# Patient Record
Sex: MALE | Race: WHITE | HISPANIC OR LATINO | Employment: FULL TIME | URBAN - METROPOLITAN AREA
[De-identification: names, ages, dates, MRNs, and addresses within clinical notes are randomized per-mention and may not be internally consistent; named-entity substitution may affect disease eponyms.]

---

## 2018-02-17 ENCOUNTER — HOSPITAL ENCOUNTER (EMERGENCY)
Facility: HOSPITAL | Age: 53
Discharge: HOME | End: 2018-02-17
Attending: EMERGENCY MEDICINE

## 2018-02-17 VITALS
HEIGHT: 66 IN | DIASTOLIC BLOOD PRESSURE: 88 MMHG | TEMPERATURE: 98 F | BODY MASS INDEX: 35.36 KG/M2 | WEIGHT: 220 LBS | HEART RATE: 78 BPM | RESPIRATION RATE: 18 BRPM | SYSTOLIC BLOOD PRESSURE: 156 MMHG | OXYGEN SATURATION: 100 %

## 2018-02-17 DIAGNOSIS — M25.729 OLECRANON BONE SPUR: Primary | ICD-10-CM

## 2018-02-17 DIAGNOSIS — R52 PAIN: ICD-10-CM

## 2018-02-17 PROCEDURE — 99283 EMERGENCY DEPT VISIT LOW MDM: CPT

## 2018-02-17 RX ORDER — INDOMETHACIN 50 MG/1
50 CAPSULE ORAL
Qty: 15 CAPSULE | Refills: 0 | Status: SHIPPED | OUTPATIENT
Start: 2018-02-17

## 2018-02-17 NOTE — ED PROVIDER NOTES
Encounter Date: 2/17/2018       History     Chief Complaint   Patient presents with    Joint Swelling     right elbow pain x 3.5 weeks.      Paul Joe 53 y.o. male with history of chronic low back painpresented to the ED with c/o right elbow and proximal forearm pain for approximate 3-1/2 weeks.  He states that he had a mechanical slip and fall onto ice at that time with direct impact to the right elbow.  He states that he began with some pain to the elbow which he thought would resolve over some time a complain with ice and rest. He states that the pain has not improved despite completing this regimen. Does report that he takes indomethacin daily for his chronic low back pain and that this does have some modest improvement in right elbow.  He states pain is exacerbated with full extension at the elbow and with palpation of the lateral forearm.  He denies any numbness, tingling, pain radiation or weakness.  He denies any previous injury of the right affected upper extremity.  This is his dominant upper extremity.      The history is provided by the patient.     Review of patient's allergies indicates:  No Known Allergies  No past medical history on file.  No past surgical history on file.  No family history on file.  Social History   Substance Use Topics    Smoking status: Not on file    Smokeless tobacco: Not on file    Alcohol use Not on file     Review of Systems   Constitutional: Negative for chills and fever.   Respiratory: Negative for shortness of breath.    Cardiovascular: Negative for chest pain.   Genitourinary: Negative for dysuria.   Musculoskeletal: Positive for arthralgias (right elbow and proximal forearm pain). Negative for back pain and joint swelling.   Skin: Negative for color change and rash.   Neurological: Negative for weakness and numbness.   Hematological: Does not bruise/bleed easily.       Physical Exam     Initial Vitals [02/17/18 1253]   BP Pulse Resp Temp SpO2   (!) 160/88 98 18  98.3 °F (36.8 °C) 100 %      MAP       112         Physical Exam    Nursing note and vitals reviewed.  Constitutional: Vital signs are normal. He appears well-developed and well-nourished. He is cooperative.  Non-toxic appearance. He does not appear ill. No distress.   HENT:   Head: Normocephalic and atraumatic.   Eyes: Conjunctivae and lids are normal.   Neck: Normal range of motion. Neck supple.   Cardiovascular: Normal rate.   Pulmonary/Chest: No respiratory distress.   Abdominal: Soft. Normal appearance.   Musculoskeletal:   Limited extension about the right elbow although full passive extension.  TTP of the radial head and lateral epicondyles with no obvious bony deformity or crepitus.  Strength 5 out of 5 bilaterally.  Distal pulses intact.  Capillary refill intact.   Neurological: He is alert and oriented to person, place, and time. GCS eye subscore is 4. GCS verbal subscore is 5. GCS motor subscore is 6.   Skin: Skin is warm, dry and intact. No rash noted.   Psychiatric: He has a normal mood and affect. His speech is normal and behavior is normal. Thought content normal.         ED Course   Procedures  Labs Reviewed - No data to display      Imaging Results          X-Ray Elbow Complete Right (Final result)  Result time 02/17/18 14:04:41    Final result by Brenda Greene MD (02/17/18 14:04:41)                 Impression:     As above.      Electronically signed by: Brenda Greene MD  Date:     02/17/18  Time:    14:04              Narrative:    Right elbow, 3 views: There is an olecranon spur.  There is mild spurring from the lateral epicondyle.  The joint spaces are maintained.  No fracture or dislocation.  No joint effusion.                             X-Ray Forearm Right (Final result)  Result time 02/17/18 14:04:06    Final result by Brenda Greene MD (02/17/18 14:04:06)                 Impression:     As above.      Electronically signed by: Brenda Greene MD  Date:     02/17/18  Time:    14:04               Narrative:    Right forearm, 2 views: There is an olecranon spur.  No fracture or dislocation is identified.  The soft tissues appear normal.                            Paul Joe 53 y.o. male with history of chronic low back painpresented to the ED with c/o right elbow and proximal forearm pain for approximate 3-1/2 weeks.  He states that he had a mechanical slip and fall onto ice at that time with direct impact to the right elbow.  He states that he began with some pain to the elbow which he thought would resolve over some time a complain with ice and rest. He states that the pain has not improved despite completing this regimen. Does report that he takes indomethacin daily for his chronic low back pain and that this does have some modest improvement in right elbow.  He states pain is exacerbated with full extension at the elbow and with palpation of the lateral forearm.  He denies any numbness, tingling, pain radiation or weakness.  He denies any previous injury of the right affected upper extremity.  This is his dominant upper extremity. ROS positive for right elbow pain.  Physical exam reveals patient well appearing in no obvious distress. Limited extension about the right elbow although full passive extension.  TTP of the radial head and lateral epicondyles with no obvious bony deformity or crepitus.  Strength 5 out of 5 bilaterally.  Distal pulses intact.  Capillary refill intact.    DDX: fracture, sprain, dislocation    ED management: x-ray showing no acute bony process; olecranon spur and lateral epicondyle noted.  I believe patient has a some soft tissue injury which has likely exacerbated the visualized spurs in the area. He was encouraged to continue indomethacin and rice therapy.  I do believe patient could benefit from small forearm brace as he has some tenderness over the lateral epicondyle consistent with epicondylitis.  He is visiting from [place] and is unsure of when he is  returning home and was divided with reference to follow-up with here.    Impression/Plan: The primary encounter diagnosis was Olecranon bone spur. A diagnosis of Pain was also pertinent to this visit.  Discharged with motrin. Patient will follow up with Primary.  Patient cautioned on when to return to ED.  Pt. Understands and agrees with current treatment plan                        Attending Attestation:     Physician Attestation Statement for NP/PA:   I reviewed the chart but I did not personally examine the patient. The face to face encounter was performed by the NP/PA.                     Clinical Impression:   The primary encounter diagnosis was Olecranon bone spur. A diagnosis of Pain was also pertinent to this visit.    Disposition:   Disposition: Discharged  Condition: Stable                        ANUJ Colon  02/21/18 [id number]       Selam Matthews MD  02/23/18 [id number]